# Patient Record
Sex: MALE | Employment: FULL TIME | URBAN - METROPOLITAN AREA
[De-identification: names, ages, dates, MRNs, and addresses within clinical notes are randomized per-mention and may not be internally consistent; named-entity substitution may affect disease eponyms.]

---
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
## 2019-03-13 ENCOUNTER — HOSPITAL ENCOUNTER (EMERGENCY)
Facility: CLINIC | Age: 19
Discharge: HOME | End: 2019-03-13
Attending: EMERGENCY MEDICINE | Admitting: EMERGENCY MEDICINE

## 2019-03-13 ENCOUNTER — APPOINTMENT (OUTPATIENT)
Dept: GENERAL RADIOLOGY | Facility: CLINIC | Age: 19
End: 2019-03-13
Attending: EMERGENCY MEDICINE

## 2019-03-13 VITALS
WEIGHT: 147 LBS | DIASTOLIC BLOOD PRESSURE: 85 MMHG | RESPIRATION RATE: 18 BRPM | SYSTOLIC BLOOD PRESSURE: 133 MMHG | OXYGEN SATURATION: 99 % | TEMPERATURE: 98.4 F

## 2019-03-13 DIAGNOSIS — S90.111A CONTUSION OF RIGHT GREAT TOE WITHOUT DAMAGE TO NAIL, INITIAL ENCOUNTER: ICD-10-CM

## 2019-03-13 PROCEDURE — 73590 X-RAY EXAM OF LOWER LEG: CPT | Mod: RT

## 2019-03-13 PROCEDURE — 73630 X-RAY EXAM OF FOOT: CPT | Mod: RT

## 2019-03-13 PROCEDURE — 99283 EMERGENCY DEPT VISIT LOW MDM: CPT | Performed by: EMERGENCY MEDICINE

## 2019-03-13 PROCEDURE — 99283 EMERGENCY DEPT VISIT LOW MDM: CPT | Mod: Z6 | Performed by: EMERGENCY MEDICINE

## 2019-03-13 ASSESSMENT — ENCOUNTER SYMPTOMS
COLOR CHANGE: 1
MYALGIAS: 0
NUMBNESS: 0
WEAKNESS: 0

## 2019-03-13 NOTE — DISCHARGE INSTRUCTIONS
Please make an appointment to follow up with Your Primary Care Provider or Primary Care Center (phone: [phone] in 7 days as needed.      Use hard sole shoe and crutches for support.  Use acetaminophen 1000 mg every 6 hours or ibuprofen 600 mg every 6 hours for pain control.  Ice and elevate for swelling and discomfort.

## 2019-03-13 NOTE — ED TRIAGE NOTES
Pt was playing soccer last night and kicked ball and another person kicked ball at same time.  Pt having pain in right great toe and ball of foot and radiating to top of foot.

## 2019-03-13 NOTE — LETTER
March 13, 2019      To Whom It May Concern:      Balbir Carrillo was seen in our Emergency Department today, 03/13/19. He may return to work on 3/16/19 but may still need light duty if unable to walk without crutches.      Sincerely,        Fifi Goss MD

## 2019-03-13 NOTE — ED PROVIDER NOTES
[facility] EMERGENCY DEPARTMENT ([facility])    3/13/19     ED 18   1:08 PM   History     Chief Complaint   Patient presents with     Foot Pain     right     The history is provided by the patient and medical records.     Balbir Carrillo is a 18 year old male who presents with right foot pain. He has a prior history of right ankle sprain in 2017. He was playing soccer last night, went to kick the ball but an opposing player was kicking the ball at the same time. He developed right foot and great toe pain with this. Pain worsens with bearing weight and putting a shoe on it. He has been wearing sandals for this. Pain extend from his great toe up to the midfoot as well as the sole of his foot. He has tried Icy Hot and Tylenol without relief. Pain in his great toe worsens with just bending it. He can move it, albeit painfully. No numbness to his foot. He has some mild medial ankle pain as well. No other injuries.      I have reviewed the Medications, Allergies, Past Medical and Surgical History, and Social History in the Epic system.    Review of Systems   Musculoskeletal: Negative for myalgias.   Skin: Positive for color change.   Neurological: Negative for weakness and numbness.       Physical Exam   BP: 136/75  Heart Rate: 62  Temp: 98.4  F (36.9  C)  Resp: 18  Weight: 66.7 kg (147 lb)  SpO2: 98 %      Physical Exam  General: patient is alert and oriented and in no acute distress   Head: atraumatic and normocephalic   EENT: moist mucus membranes   Neck: supple   Cardiovascular: regular rate and rhythm, 2+ DP pulse in right  Pulmonary: no respiratory distress  Musculoskeletal: Swelling and ecchymoses at the right great toe extending to the medial sole of the foot, tenderness to palpation at the right great toe and up to the midfoot as well as on the plantar surface of the foot, pain with midfoot squeeze, no tenderness to palpation along the fifth or fourth metatarsal, tenderness at the distal medial  malleolus, no tenderness along the lateral malleolus or with mid calf squeeze  Neurological: alert and oriented, 5 out of 5 strength of ankle plantar dorsiflexion, minimal flexion and extension of the metatarsophalangeal joint secondary to pain, sensation to light touch along the medial, lateral, plantar and dorsal surface of the foot is intact   skin: warm, dry, ecchymosis on the medial and plantar surface of the foot    ED Course        Procedures             Critical Care time:  none             Labs Ordered and Resulted from Time of ED Arrival Up to the Time of Departure from the ED - No data to display         Assessments & Plan (with Medical Decision Making)   Patient is a 18-year-old male who is otherwise healthy and presents with right foot pain following a soccer injury yesterday.  He is neurovascularly intact.  Plain films were obtained of the foot and ankle which are negative.  He was given a hard soled shoe and crutches and instructed to follow-up with his primary care provider in 1 week for repeat imaging if not improved.  Was instructed on conservative management for at home and voiced understanding.      This part of the document was transcribed by Jaiden Butleribshamar.      I have reviewed the nursing notes.    I have reviewed the findings, diagnosis, plan and need for follow up with the patient.       Medication List      There are no discharge medications for this visit.         Final diagnoses:   Contusion of right great toe without damage to nail, initial encounter   I, Katerina Jim, am serving as a trained medical scribe to document services personally performed by Fifi Boston MD based on the provider's statements to me on March 13, 2019.  This document has been checked and approved by the attending provider.    I, Fifi Boston MD, was physically present and have reviewed and verified the accuracy of this note documented by jaiden Crewsibshamar.        3/13/2019   [facility], [place], EMERGENCY DEPARTMENT     Fifi Goss MD  03/13/19 1520       Fifi Goss MD  03/18/19 7454